# Patient Record
(demographics unavailable — no encounter records)

---

## 2025-01-28 NOTE — HISTORY OF PRESENT ILLNESS
[TextBox_4] : 38-year-old male with a history of severe obstructive sleep apnea syndrome documented in November 5, 2018 with a home study with an AHI of 73.  He has been on CPAP 14 cm since then.  He is doing beautifully with no excess daytime sleepiness and sleeping well.  However his machine broke 3 weeks ago and he is currently using his travel CPAP.  His weight is down about 20 pounds he now weighs 272 he is 5 feet 8.  He currently is on Mounjaro.  Past history of diabetes, depression, hypothyroidism, psoriasis.  He comes in today asking for a new CPAP machine.

## 2025-01-28 NOTE — ASSESSMENT
[FreeTextEntry1] : Patient with history of severe sleep apnea doing well on CPAP 14.  His machine is broken beyond repair and is more than 5 years old.  I will get patient a new CPAP machine at 14 cm.

## 2025-05-14 NOTE — ASSESSMENT
[FreeTextEntry1] : Patient with severe sleep apnea benefiting and compliant with CPAP therapy.  He is advised regarding weight reduction and to start exercising.  He will return in 1 year.

## 2025-05-14 NOTE — HISTORY OF PRESENT ILLNESS
[TextBox_4] : Patient comes in for CPAP compliance.  He got a new machine in February.  He has used it for more than 4 hours on 93% of the last 3 months for an average of 8 hours and 42 minutes with an AHI of 1.0.  He uses nasal pillows and has occasional irritation on the nostrils.  He generally feels well sleeps well and has no complaints.  His weight is unchanged at 290.  He remains on Mounjaro but is not losing weight.  He does not exercise.